# Patient Record
Sex: MALE | Race: BLACK OR AFRICAN AMERICAN | Employment: UNEMPLOYED | URBAN - METROPOLITAN AREA
[De-identification: names, ages, dates, MRNs, and addresses within clinical notes are randomized per-mention and may not be internally consistent; named-entity substitution may affect disease eponyms.]

---

## 2017-02-18 ENCOUNTER — HOSPITAL ENCOUNTER (EMERGENCY)
Age: 32
Discharge: HOME | End: 2017-02-18
Attending: EMERGENCY MEDICINE
Payer: MEDICAID

## 2017-02-18 ENCOUNTER — APPOINTMENT (OUTPATIENT)
Dept: GENERAL RADIOLOGY | Age: 32
End: 2017-02-18
Payer: MEDICAID

## 2017-02-18 VITALS
HEIGHT: 65 IN | BODY MASS INDEX: 33.32 KG/M2 | DIASTOLIC BLOOD PRESSURE: 92 MMHG | SYSTOLIC BLOOD PRESSURE: 141 MMHG | TEMPERATURE: 98.8 F | RESPIRATION RATE: 16 BRPM | HEART RATE: 82 BPM | OXYGEN SATURATION: 99 % | WEIGHT: 200 LBS

## 2017-02-18 DIAGNOSIS — J20.9 ACUTE BRONCHITIS, UNSPECIFIED ORGANISM: ICD-10-CM

## 2017-02-18 DIAGNOSIS — R05.9 COUGH: Primary | ICD-10-CM

## 2017-02-18 DIAGNOSIS — R05.8 SPUTUM PRODUCTION: ICD-10-CM

## 2017-02-18 PROCEDURE — 6370000000 HC RX 637 (ALT 250 FOR IP): Performed by: EMERGENCY MEDICINE

## 2017-02-18 PROCEDURE — 71020 XR CHEST STANDARD TWO VW: CPT | Performed by: RADIOLOGY

## 2017-02-18 PROCEDURE — 71020 XR CHEST STANDARD TWO VW: CPT

## 2017-02-18 PROCEDURE — 99283 EMERGENCY DEPT VISIT LOW MDM: CPT

## 2017-02-18 RX ORDER — BENZONATATE 100 MG/1
100 CAPSULE ORAL ONCE
Status: COMPLETED | OUTPATIENT
Start: 2017-02-18 | End: 2017-02-18

## 2017-02-18 RX ORDER — BENZONATATE 100 MG/1
100 CAPSULE ORAL 3 TIMES DAILY PRN
Qty: 30 CAPSULE | Refills: 0 | Status: SHIPPED | OUTPATIENT
Start: 2017-02-18 | End: 2017-02-25

## 2017-02-18 RX ORDER — AZITHROMYCIN 250 MG/1
TABLET, FILM COATED ORAL
Qty: 1 PACKET | Refills: 0 | Status: SHIPPED | OUTPATIENT
Start: 2017-02-18 | End: 2017-02-28

## 2017-02-18 RX ADMIN — BENZOCAINE AND MENTHOL 1 LOZENGE: 15; 4 LOZENGE ORAL at 10:24

## 2017-02-18 RX ADMIN — BENZONATATE 100 MG: 100 CAPSULE ORAL at 10:24

## 2017-02-18 ASSESSMENT — PAIN DESCRIPTION - FREQUENCY: FREQUENCY: INTERMITTENT

## 2017-02-18 ASSESSMENT — PAIN SCALES - GENERAL: PAINLEVEL_OUTOF10: 7

## 2017-02-18 ASSESSMENT — ENCOUNTER SYMPTOMS
BLURRED VISION: 0
SPUTUM PRODUCTION: 1
COUGH: 1
ABDOMINAL PAIN: 0
SHORTNESS OF BREATH: 0

## 2017-02-18 ASSESSMENT — PAIN DESCRIPTION - DESCRIPTORS: DESCRIPTORS: SORE

## 2017-02-18 ASSESSMENT — PAIN DESCRIPTION - LOCATION: LOCATION: CHEST;BACK

## 2017-02-18 ASSESSMENT — PAIN DESCRIPTION - PAIN TYPE: TYPE: ACUTE PAIN

## 2018-01-25 ENCOUNTER — HOSPITAL ENCOUNTER (EMERGENCY)
Age: 33
Discharge: HOME | End: 2018-01-25
Attending: EMERGENCY MEDICINE
Payer: MEDICAID

## 2018-01-25 VITALS
RESPIRATION RATE: 18 BRPM | DIASTOLIC BLOOD PRESSURE: 95 MMHG | HEART RATE: 76 BPM | OXYGEN SATURATION: 98 % | TEMPERATURE: 97.7 F | SYSTOLIC BLOOD PRESSURE: 149 MMHG

## 2018-01-25 DIAGNOSIS — H65.03 BILATERAL ACUTE SEROUS OTITIS MEDIA, RECURRENCE NOT SPECIFIED: Primary | ICD-10-CM

## 2018-01-25 PROCEDURE — 6370000000 HC RX 637 (ALT 250 FOR IP): Performed by: PHYSICIAN ASSISTANT

## 2018-01-25 PROCEDURE — 99282 EMERGENCY DEPT VISIT SF MDM: CPT

## 2018-01-25 RX ORDER — AMOXICILLIN 500 MG/1
500 CAPSULE ORAL 3 TIMES DAILY
Qty: 30 CAPSULE | Refills: 0 | Status: SHIPPED | OUTPATIENT
Start: 2018-01-25 | End: 2018-02-04

## 2018-01-25 RX ORDER — IBUPROFEN 800 MG/1
800 TABLET ORAL EVERY 8 HOURS PRN
Qty: 30 TABLET | Refills: 0 | Status: SHIPPED | OUTPATIENT
Start: 2018-01-25 | End: 2018-05-17

## 2018-01-25 RX ORDER — ACETAMINOPHEN 325 MG/1
650 TABLET ORAL ONCE
Status: COMPLETED | OUTPATIENT
Start: 2018-01-25 | End: 2018-01-25

## 2018-01-25 RX ORDER — PSEUDOEPHEDRINE HCL 120 MG/1
120 TABLET, FILM COATED, EXTENDED RELEASE ORAL EVERY 12 HOURS
Qty: 14 TABLET | Refills: 1 | Status: SHIPPED | OUTPATIENT
Start: 2018-01-25 | End: 2018-02-01

## 2018-01-25 RX ORDER — ACETAMINOPHEN 325 MG/1
325 TABLET ORAL EVERY 6 HOURS PRN
Qty: 120 TABLET | Refills: 3 | Status: SHIPPED | OUTPATIENT
Start: 2018-01-25

## 2018-01-25 RX ORDER — AMOXICILLIN 250 MG/1
500 CAPSULE ORAL ONCE
Status: COMPLETED | OUTPATIENT
Start: 2018-01-25 | End: 2018-01-25

## 2018-01-25 RX ADMIN — AMOXICILLIN 500 MG: 250 CAPSULE ORAL at 13:36

## 2018-01-25 RX ADMIN — ACETAMINOPHEN 650 MG: 325 TABLET ORAL at 13:35

## 2018-01-25 ASSESSMENT — ENCOUNTER SYMPTOMS
GASTROINTESTINAL NEGATIVE: 1
RESPIRATORY NEGATIVE: 1
ALLERGIC/IMMUNOLOGIC NEGATIVE: 1

## 2018-01-25 ASSESSMENT — PAIN SCALES - GENERAL
PAINLEVEL_OUTOF10: 6
PAINLEVEL_OUTOF10: 7

## 2018-01-25 NOTE — ED PROVIDER NOTES
[facility]  ED  eMERGENCY dEPARTMENT eNCOUnter   Attending Attestation     Pt Name: Nicki Joiner  MRN: [id number]  [place] 1985  Date of evaluation: 1/25/18       Nicki Joiner is a 28 y.o. male who presents with Otalgia (Pt to ED with c/o bilateral ear pain since this last night worse this morning, pt states is getting over \"a bug\" no sore throat, no n/v/d)      History: Pt presents with Bilateral ear pain. Pt states he has recently been ill. No fever, chills, nausea, vomiting or diarrhea. No other complaints. Exam:  Bilateral ear erythema more severe on right with bulging. Will plan to start abx and discharge for otitis media. I performed a history and physical examination of the patient and discussed management with the resident. I reviewed the residents note and agree with the documented findings and plan of care. Any areas of disagreement are noted on the chart. I was personally present for the key portions of any procedures. I have documented in the chart those procedures where I was not present during the key portions. I have personally reviewed all images and agree with the resident's interpretation. I have reviewed the emergency nurses triage note. I agree with the chief complaint, past medical history, past surgical history, allergies, medications, social and family history as documented unless otherwise noted below. Documentation of the HPI, Physical Exam and Medical Decision Making performed by medical students or scribes is based on my personal performance of the HPI, PE and MDM. For Phys Assistant/ Nurse Practitioner cases/documentation I have had a face to face evaluation of this patient and have completed at least one if not all key elements of the E/M (history, physical exam, and MDM). Additional findings are as noted.     For APC cases I have personally evaluated and examined the patient in conjunction with the APC and agree with the treatment plan and disposition of the patient as recorded by the APC.     Francis Alvarez MD  Attending Emergency  Physician        Juliette Dunbar MD  01/25/18 1983

## 2018-01-26 NOTE — ED PROVIDER NOTES
name: N/A    Number of children: N/A    Years of education: N/A     Occupational History    Not on file. Social History Main Topics    Smoking status: Current Every Day Smoker     Types: Cigarettes    Smokeless tobacco: Not on file    Alcohol use No    Drug use: Yes     Types: Marijuana    Sexual activity: Not on file     Other Topics Concern    Not on file     Social History Narrative    No narrative on file       History reviewed. No pertinent family history. Allergies:  Review of patient's allergies indicates no known allergies. Home Medications:  Prior to Admission medications    Medication Sig Start Date End Date Taking? Authorizing Provider   acetaminophen (TYLENOL) 325 MG tablet Take 1 tablet by mouth every 6 hours as needed for Pain 1/25/18  Yes Allie Duncan PA-C   ibuprofen (ADVIL;MOTRIN) 800 MG tablet Take 1 tablet by mouth every 8 hours as needed for Pain or Fever 1/25/18  Yes Allie Duncan PA-C   benzocaine-menthol (CEPACOL SORE THROAT) 15-3.6 MG lozenge Take 1 lozenge by mouth every 2 hours as needed for Sore Throat 1/25/18  Yes Kelley Caputo PA-C   amoxicillin (AMOXIL) 500 MG capsule Take 1 capsule by mouth 3 times daily for 10 days 1/25/18 2/4/18 Yes Kelley Caputo PA-C   pseudoephedrine (SUDAFED 12 HOUR) 120 MG extended release tablet Take 1 tablet by mouth every 12 hours for 7 days For sinus congestion and ear pain 1/25/18 2/1/18 Yes Allie Duncan PA-C       REVIEW OF SYSTEMS    (2-9 systems for level 4, 10 or more for level 5)      Review of Systems   Constitutional: Negative. HENT: Positive for ear pain. Respiratory: Negative. Cardiovascular: Negative. Gastrointestinal: Negative. Genitourinary: Negative. Skin: Negative. Allergic/Immunologic: Negative. PHYSICAL EXAM   (up to 7 for level 4, 8 or more for level 5)      INITIAL VITALS:  oral temperature is 97.7 °F (36.5 °C).  His blood pressure is

## 2018-02-02 ENCOUNTER — HOSPITAL ENCOUNTER (EMERGENCY)
Age: 33
Discharge: HOME | End: 2018-02-02
Attending: EMERGENCY MEDICINE
Payer: MEDICAID

## 2018-02-02 ENCOUNTER — APPOINTMENT (OUTPATIENT)
Dept: GENERAL RADIOLOGY | Age: 33
End: 2018-02-02
Payer: MEDICAID

## 2018-02-02 VITALS
WEIGHT: 210 LBS | BODY MASS INDEX: 31.83 KG/M2 | OXYGEN SATURATION: 97 % | DIASTOLIC BLOOD PRESSURE: 88 MMHG | HEART RATE: 77 BPM | RESPIRATION RATE: 19 BRPM | TEMPERATURE: 98.8 F | SYSTOLIC BLOOD PRESSURE: 134 MMHG | HEIGHT: 68 IN

## 2018-02-02 DIAGNOSIS — F41.0 ANXIETY ATTACK: Primary | ICD-10-CM

## 2018-02-02 DIAGNOSIS — H93.8X3 SENSATION OF FULLNESS IN BOTH EARS: ICD-10-CM

## 2018-02-02 LAB
ABSOLUTE EOS #: 0.04 K/UL (ref 0–0.44)
ABSOLUTE IMMATURE GRANULOCYTE: 0.04 K/UL (ref 0–0.3)
ABSOLUTE LYMPH #: 3.09 K/UL (ref 1.1–3.7)
ABSOLUTE MONO #: 0.65 K/UL (ref 0.1–1.2)
ANION GAP SERPL CALCULATED.3IONS-SCNC: 12 MMOL/L (ref 9–17)
BASOPHILS # BLD: 0 % (ref 0–2)
BASOPHILS ABSOLUTE: <0.03 K/UL (ref 0–0.2)
BUN BLDV-MCNC: 10 MG/DL (ref 6–20)
BUN/CREAT BLD: ABNORMAL (ref 9–20)
CALCIUM SERPL-MCNC: 9.4 MG/DL (ref 8.6–10.4)
CHLORIDE BLD-SCNC: 101 MMOL/L (ref 98–107)
CO2: 25 MMOL/L (ref 20–31)
CREAT SERPL-MCNC: 1.04 MG/DL (ref 0.7–1.2)
DIFFERENTIAL TYPE: ABNORMAL
EKG ATRIAL RATE: 90 BPM
EKG P AXIS: 37 DEGREES
EKG P-R INTERVAL: 158 MS
EKG Q-T INTERVAL: 324 MS
EKG QRS DURATION: 98 MS
EKG QTC CALCULATION (BAZETT): 396 MS
EKG R AXIS: 4 DEGREES
EKG T AXIS: 20 DEGREES
EKG VENTRICULAR RATE: 90 BPM
EOSINOPHILS RELATIVE PERCENT: 0 % (ref 1–4)
GFR AFRICAN AMERICAN: >60 ML/MIN
GFR NON-AFRICAN AMERICAN: >60 ML/MIN
GFR SERPL CREATININE-BSD FRML MDRD: ABNORMAL ML/MIN/{1.73_M2}
GFR SERPL CREATININE-BSD FRML MDRD: ABNORMAL ML/MIN/{1.73_M2}
GLUCOSE BLD-MCNC: 120 MG/DL (ref 70–99)
HCT VFR BLD CALC: 46 % (ref 40.7–50.3)
HEMOGLOBIN: 14.3 G/DL (ref 13–17)
IMMATURE GRANULOCYTES: 0 %
LYMPHOCYTES # BLD: 30 % (ref 24–43)
MCH RBC QN AUTO: 27.3 PG (ref 25.2–33.5)
MCHC RBC AUTO-ENTMCNC: 31.1 G/DL (ref 28.4–34.8)
MCV RBC AUTO: 87.8 FL (ref 82.6–102.9)
MONOCYTES # BLD: 6 % (ref 3–12)
NRBC AUTOMATED: 0 PER 100 WBC
PDW BLD-RTO: 13.3 % (ref 11.8–14.4)
PLATELET # BLD: 208 K/UL (ref 138–453)
PLATELET ESTIMATE: ABNORMAL
PMV BLD AUTO: 12 FL (ref 8.1–13.5)
POC TROPONIN I: 0 NG/ML (ref 0–0.1)
POC TROPONIN I: 0 NG/ML (ref 0–0.1)
POC TROPONIN INTERP: NORMAL
POC TROPONIN INTERP: NORMAL
POTASSIUM SERPL-SCNC: 4.2 MMOL/L (ref 3.7–5.3)
RBC # BLD: 5.24 M/UL (ref 4.21–5.77)
RBC # BLD: ABNORMAL 10*6/UL
SEG NEUTROPHILS: 64 % (ref 36–65)
SEGMENTED NEUTROPHILS ABSOLUTE COUNT: 6.41 K/UL (ref 1.5–8.1)
SODIUM BLD-SCNC: 138 MMOL/L (ref 135–144)
TSH SERPL DL<=0.05 MIU/L-ACNC: 0.49 MIU/L (ref 0.3–5)
WBC # BLD: 10.2 K/UL (ref 3.5–11.3)
WBC # BLD: ABNORMAL 10*3/UL

## 2018-02-02 PROCEDURE — 71045 X-RAY EXAM CHEST 1 VIEW: CPT

## 2018-02-02 PROCEDURE — 99285 EMERGENCY DEPT VISIT HI MDM: CPT

## 2018-02-02 PROCEDURE — 80048 BASIC METABOLIC PNL TOTAL CA: CPT

## 2018-02-02 PROCEDURE — 84443 ASSAY THYROID STIM HORMONE: CPT

## 2018-02-02 PROCEDURE — 93005 ELECTROCARDIOGRAM TRACING: CPT

## 2018-02-02 PROCEDURE — 6370000000 HC RX 637 (ALT 250 FOR IP): Performed by: EMERGENCY MEDICINE

## 2018-02-02 PROCEDURE — 84484 ASSAY OF TROPONIN QUANT: CPT

## 2018-02-02 PROCEDURE — 85025 COMPLETE CBC W/AUTO DIFF WBC: CPT

## 2018-02-02 RX ORDER — LORAZEPAM 0.5 MG/1
1 TABLET ORAL ONCE
Status: COMPLETED | OUTPATIENT
Start: 2018-02-02 | End: 2018-02-02

## 2018-02-02 RX ADMIN — LORAZEPAM 1 MG: 0.5 TABLET ORAL at 09:40

## 2018-02-02 ASSESSMENT — ENCOUNTER SYMPTOMS
SHORTNESS OF BREATH: 0
VOMITING: 0
WHEEZING: 0
NAUSEA: 0
COLOR CHANGE: 0

## 2018-02-02 ASSESSMENT — HEART SCORE: ECG: 1

## 2018-02-02 NOTE — ED TRIAGE NOTES
Patient states that he woke up this morning felt his heart racing. Patient denies any SOB, N/V, or pain. Patient denies any numbness/tingling. Patient denies any Hx of anxiety but states that he felt anxious when his heart was racing.

## 2018-02-02 NOTE — ED PROVIDER NOTES
[facility]     Emergency Department     Faculty Attestation    I performed a history and physical examination of the patient and discussed management with the resident. I have reviewed and agree with the residents findings including all diagnostic interpretations, and treatment plans as written. Any areas of disagreement are noted on the chart. I was personally present for the key portions of any procedures. I have documented in the chart those procedures where I was not present during the key portions. I have reviewed the emergency nurses triage note. I agree with the chief complaint, past medical history, past surgical history, allergies, medications, social and family history as documented unless otherwise noted below. Documentation of the HPI, Physical Exam and Medical Decision Making performed by sy is based on my personal performance of the HPI, PE and MDM. For Physician Assistant/ Nurse Practitioner cases/documentation I have personally evaluated this patient and have completed at least one if not all key elements of the E/M (history, physical exam, and MDM). Additional findings are as noted. Primary Care Physician: No primary care provider on file. History: This is a 28 y.o. male who presents to the Emergency Department with complaint of heart racing. Patient recently being treated for otitis media, on amoxicillin, and still taking antibiotics. Has had decreased hearing for the past week, woke up this morning and continues to have decreased hearing and patient states that he got very anxious and felt his heart racing. Patient denies any drainage or discharge, no worsening hearing, no pain to his ears. Denies any cough or congestion or runny nose no fevers or chills, no chest pain or shortness of breath. Physical:   height is 5' 8\" (1.727 m) and weight is 210 lb (95.3 kg). His oral temperature is 98.8 °F (37.1 °C).  His blood pressure is 158/81 (abnormal) and his pulse is 98. His respiration is 18 and oxygen saturation is 97%. Bilateral tympanic membranes visualized, there is serous otitis media noted, no erythema noted to the tympanic membrane, no erythema to the external ear canal, no pain with external manipulation of the bilateral tragus, no pain over the bilateral mastoids or erythema overlying that region. Cardiac vascular: Regular rate and rhythm no murmurs gallops or rubs,  Respiratory: Lungs are clear to auscultation bilaterally without any rales, wheezes or rhonchi    Impression: Serous otitis media    Plan: CBC, BMP, EKG, one troponin, chest x-ray, decongestant, ENT follow-up      Pre-hypertension/Hypertension: The patient has been informed that they may have pre-hypertension or Hypertension based on a blood pressure reading in the emergency department. I recommend that the patient call the primary care provider listed on their discharge instructions or a physician of their choice this week to arrange follow up for further evaluation of possible pre-hypertension or Hypertension. EKG Interpretation    Interpreted by me    EKG shows normal sinus rhythm with a ventricular rate of 90, MO interval of 158, QRS of 98, , normal axis, no ST-T changes noted, there is poor R-wave progression. Concerning for anterior infarct of age undetermined    No prior EKG for comparison.       Fay Machuca D.O, M.P.H  Attending Emergency Medicine Physician         Fay Machuca DO  02/02/18 [id number]

## 2018-02-02 NOTE — ED PROVIDER NOTES
[facility] ED  Emergency Department Encounter  Emergency Medicine Resident     Pt Name: Susan Matthews  MRN: [id number]  [place] 1985  Date of evaluation: 2/2/18  PCP:  No primary care provider on file. CHIEF COMPLAINT       Chief Complaint   Patient presents with    Tachycardia    Anxiety       HISTORY OF PRESENT ILLNESS  (Location/Symptom, Timing/Onset, Context/Setting, Quality, Duration, Modifying Factors, Severity.)      Susan Matthews is a 28 y.o. male who presents Complaining of anxiety, heart palpitations/tachycardia. States he was here recently for an ear infection and when he awoke this morning in his groggy state he \"freaked out and thought I was deaf for a second and began to have a anxiety attack. \"  Denies a significant history of anxiety. Denies any history of heart problems, lung problems . He is currently on amoxicillin with roughly 3 days left. Reports his ear symptoms have been relatively stable to mildly improved. Denies family hx heart problems. PAST MEDICAL / SURGICAL / SOCIAL / FAMILY HISTORY      has a past medical history of Hand fracture, right.       has a past surgical history that includes hernia repair. Social History     Social History    Marital status: Single     Spouse name: N/A    Number of children: N/A    Years of education: N/A     Occupational History    Not on file. Social History Main Topics    Smoking status: Current Every Day Smoker     Types: Cigarettes    Smokeless tobacco: Not on file    Alcohol use No    Drug use: Yes     Types: Marijuana    Sexual activity: Not on file     Other Topics Concern    Not on file     Social History Narrative    No narrative on file         History reviewed. No pertinent family history. Allergies:  Review of patient's allergies indicates no known allergies. Home Medications:  Prior to Admission medications    Medication Sig Start Date End Date Taking?  Authorizing Provider pseudoephedrine-guaiFENesin 38.5-398 MG TABS Take 1 tablet by mouth every 6 hours as needed (congestion, ear fullness) 2/2/18  Yes He Vidal DO   acetaminophen (TYLENOL) 325 MG tablet Take 1 tablet by mouth every 6 hours as needed for Pain 1/25/18  Yes Brittaney Bhandari PA-C   ibuprofen (ADVIL;MOTRIN) 800 MG tablet Take 1 tablet by mouth every 8 hours as needed for Pain or Fever 1/25/18  Yes Brittaney Bhandari PA-C   benzocaine-menthol (CEPACOL SORE THROAT) 15-3.6 MG lozenge Take 1 lozenge by mouth every 2 hours as needed for Sore Throat 1/25/18  Yes Sánchez Caputo PA-C   amoxicillin (AMOXIL) 500 MG capsule Take 1 capsule by mouth 3 times daily for 10 days 1/25/18 2/4/18 Yes Brittaney Bhandari PA-C       REVIEW OF SYSTEMS    (2-9 systems for level 4, 10 or more for level 5)      Review of Systems   Constitutional: Negative for diaphoresis and fever. HENT: Positive for ear pain and hearing loss. Respiratory: Negative for shortness of breath and wheezing. Cardiovascular: Positive for palpitations. Negative for chest pain and leg swelling. Gastrointestinal: Negative for nausea and vomiting. Endocrine: Negative for polydipsia and polyuria. Genitourinary: Negative for difficulty urinating and dysuria. Musculoskeletal: Negative for arthralgias and joint swelling. Skin: Negative for color change and rash. Neurological: Negative for weakness and numbness. Psychiatric/Behavioral: Negative for self-injury and suicidal ideas. PHYSICAL EXAM   (up to 7 for level 4, 8 or more for level 5)      INITIAL VITALS:   /88   Pulse 77   Temp 98.8 °F (37.1 °C) (Oral)   Resp 19   Ht 5' 8\" (1.727 m)   Wt 210 lb (95.3 kg)   SpO2 97%   BMI 31.93 kg/m²     Physical Exam   Constitutional: He is oriented to person, place, and time. He appears well-developed and well-nourished. HENT:   Head: Normocephalic and atraumatic.    Mild erythema with fluid behind TMs b/l   Eyes: he was deaf for a moment upon awakening this morning, his symptoms have nearly resolved at this time however he does accept my offer for anxiolytic; cardiac screening anticipate discharge, we'll give follow-up with ear nose throat specialist in the case that ENT symptoms continue    DIAGNOSTIC RESULTS / [facility]     LABS:  Labs Reviewed   BASIC METABOLIC PANEL - Abnormal; Notable for the following:        Result Value    Glucose 120 (*)     All other components within normal limits   CBC WITH AUTO DIFFERENTIAL - Abnormal; Notable for the following:     Eosinophils % 0 (*)     All other components within normal limits   TSH WITH REFLEX   POCT TROPONIN   POCT TROPONIN   POCT TROPONIN   POCT TROPONIN     If the patient was admitted, some of the above labs may have been ordered by the admitting team(s) and were auto populated above when I refreshed my note prior to signing it. If there is any question, please check for the responsible provider for individual orders in the EHR system. Additionally, some of the above labs results may still be pending. RADIOLOGY:  All forms of imaging other than ED bedside ultrasounds are viewed and interpreted by a radiologist and their interpretations are displayed below. Xr Chest Portable    Result Date: 2/2/2018  EXAMINATION: SINGLE VIEW OF THE CHEST 2/2/2018 9:43 am COMPARISON: 02/18/2017 HISTORY: ORDERING SYSTEM PROVIDED HISTORY: chest pain TECHNOLOGIST PROVIDED HISTORY: Reason for exam:->chest pain Acute chest pain initial evaluation FINDINGS: The lungs are clear. Pulmonary vascularity is normal.  The cardiomediastinal silhouette is normal.     No acute abnormality.          EKG    Interpreted by me    Rhythm: normal sinus   Rate: normal  Axis: normal  Ectopy: none  Conduction: normal  ST Segments: no acute change  T Waves: nonspecific  Q Waves: no acute change    Clinical Impression: normal sinus rhythm, , poor R wave progression, nonspecific care provider listed in the discharge instructions or a physician of the patient's choice this week to arrange follow up for further evaluation of possible pre-hypertension or hypertension. PATIENT REFERRED TO:  Taurus Mohamud MD  [address] [facility]  [phone]    Schedule an appointment as soon as possible for a visit  Follow up on decreased hearing. [place] [id number]  [address] [address] [phone]  On 2/19/2018  For post hospital follow-up, APPOINTMENT TIME: 10:00 am , Please arrive 15 minutes early, Please bring photo ID insurance card and med list      DISCHARGE MEDICATIONS:  New Prescriptions    PSEUDOEPHEDRINE-GUAIFENESIN 38.5-398 MG TABS    Take 1 tablet by mouth every 6 hours as needed (congestion, ear fullness)       Gurinder PALMA   Emergency Medicine Resident Physician    (Please note that portions of this note were completed with a voice recognition program.  Efforts were made to edit the dictations but occasionally words are mis-transcribed.)     Gurinder Adams DO  Resident  02/02/18 [id number]

## 2018-02-02 NOTE — CARE COORDINATION
Pt listed as not having a PCP. Offered to make appointment for pt at clinic. Pt accepted. Appointment made at the [facility] on 2/19 at 1000. Pt updated and appointment card given. Pt instructed to bring photo ID,med list and insurance card to appointment. Informed pt to call clinic 24 hours in advance if unable to make appointment. Pt updated per clinic policy that a no show results in dismissal from the practice. Pt verbalized understanding.  Appointment updated on AVS

## 2018-03-08 ENCOUNTER — OFFICE VISIT (OUTPATIENT)
Dept: INTERNAL MEDICINE | Age: 33
End: 2018-03-08
Payer: MEDICAID

## 2018-03-08 VITALS
WEIGHT: 257 LBS | SYSTOLIC BLOOD PRESSURE: 132 MMHG | DIASTOLIC BLOOD PRESSURE: 88 MMHG | HEART RATE: 78 BPM | BODY MASS INDEX: 38.95 KG/M2 | HEIGHT: 68 IN

## 2018-03-08 DIAGNOSIS — H91.92 HEARING LOSS OF LEFT EAR, UNSPECIFIED HEARING LOSS TYPE: ICD-10-CM

## 2018-03-08 DIAGNOSIS — R06.83 SNORING: ICD-10-CM

## 2018-03-08 DIAGNOSIS — E66.9 CLASS 2 OBESITY WITHOUT SERIOUS COMORBIDITY WITH BODY MASS INDEX (BMI) OF 39.0 TO 39.9 IN ADULT, UNSPECIFIED OBESITY TYPE: ICD-10-CM

## 2018-03-08 PROCEDURE — 99203 OFFICE O/P NEW LOW 30 MIN: CPT | Performed by: STUDENT IN AN ORGANIZED HEALTH CARE EDUCATION/TRAINING PROGRAM

## 2018-03-08 PROCEDURE — G8427 DOCREV CUR MEDS BY ELIG CLIN: HCPCS | Performed by: STUDENT IN AN ORGANIZED HEALTH CARE EDUCATION/TRAINING PROGRAM

## 2018-03-08 PROCEDURE — G8484 FLU IMMUNIZE NO ADMIN: HCPCS | Performed by: STUDENT IN AN ORGANIZED HEALTH CARE EDUCATION/TRAINING PROGRAM

## 2018-03-08 PROCEDURE — 4004F PT TOBACCO SCREEN RCVD TLK: CPT | Performed by: STUDENT IN AN ORGANIZED HEALTH CARE EDUCATION/TRAINING PROGRAM

## 2018-03-08 PROCEDURE — G8417 CALC BMI ABV UP PARAM F/U: HCPCS | Performed by: STUDENT IN AN ORGANIZED HEALTH CARE EDUCATION/TRAINING PROGRAM

## 2018-03-08 ASSESSMENT — PATIENT HEALTH QUESTIONNAIRE - PHQ9
2. FEELING DOWN, DEPRESSED OR HOPELESS: 0
1. LITTLE INTEREST OR PLEASURE IN DOING THINGS: 0
SUM OF ALL RESPONSES TO PHQ9 QUESTIONS 1 & 2: 0
SUM OF ALL RESPONSES TO PHQ QUESTIONS 1-9: 0

## 2018-03-08 NOTE — PROGRESS NOTES
TABS Take 1 tablet by mouth every 6 hours as needed (congestion, ear fullness) 20 tablet 0    acetaminophen (TYLENOL) 325 MG tablet Take 1 tablet by mouth every 6 hours as needed for Pain 120 tablet 3    ibuprofen (ADVIL;MOTRIN) 800 MG tablet Take 1 tablet by mouth every 8 hours as needed for Pain or Fever 30 tablet 0    benzocaine-menthol (CEPACOL SORE THROAT) 15-3.6 MG lozenge Take 1 lozenge by mouth every 2 hours as needed for Sore Throat 40 lozenge 0     No current facility-administered medications on file prior to visit. FAMILY HISTORY:          Problem Relation Age of Onset    Sleep Apnea Mother     Bipolar Disorder Mother        REVIEW OF SYSTEMS:    · General: no significant weight changes. · Dermatological: no abnormal pigmentation, rash, or itching. · Ears/Nose/Throat: denies abnormal smelling or throat pain, left sided hearing loss+  · Respiratory: no cough, pleuritic chest pain, dyspnea, or wheezing  · Cardiovascular: no pain, dyspnea on exertion, orthopnea, palpitations, or claudication  · Gastrointestinal: no nausea, vomiting, heartburn, diarrhea, constipation, bloating, or abdominal pain. No bloody or black stools. · Genito-Urinary: no urinary urgency, frequency, dysuria, nocturia, hesitancy, incontinence, or pain. No hematuria  · Musculoskeletal: no myalgia, weakness, or morning stiffness, bilateral knee pain  · Neurologic: no TIA or stroke symptoms. no paralysis, or frequent or severe headaches  · Endocrine: no heat or cold intolerance and no polyuria      PHYSICAL EXAM:      Vitals:    03/08/18 1040   BP: (!) 141/92   Site: Left Arm   Position: Sitting   Cuff Size: Large Adult   Pulse: 78   Weight: 257 lb (116.6 kg)   Height: 5' 8\" (1.727 m)     General appearance - alert, well appearing, and in no distress  Eyes - pupils equal and reactive, extraocular eye movements intact  Ears - could not see right TM, no redness, left TM intact, no redness, or discharge, some wax present.   Mouth - mucous membranes moist, pharynx normal without lesions  Chest - clear to auscultation, no wheezes, rales or rhonchi, symmetric air entry  Heart - normal rate, regular rhythm, normal S1, S2, no murmurs, rubs, clicks or gallops  Abdomen - soft, nontender, nondistended, no masses or organomegaly  Neurological - alert, oriented, normal speech, no focal findings or movement disorder noted  Extremities - peripheral pulses normal, no pedal edema, no clubbing or cyanosis    LABORATORY FINDINGS:    CBC:   Lab Results   Component Value Date    WBC 10.2 02/02/2018    HGB 14.3 02/02/2018     02/02/2018     BMP:    Lab Results   Component Value Date     02/02/2018    K 4.2 02/02/2018     02/02/2018    CO2 25 02/02/2018    BUN 10 02/02/2018    CREATININE 1.04 02/02/2018    GLUCOSE 120 02/02/2018     Hemoglobin A1C: No results found for: LABA1C  Lipid profile: No results found for: CHOL, TRIG, HDL  Thyroid functions:   Lab Results   Component Value Date    TSH 0.49 02/02/2018      Hepatic functions: No results found for: ALT, AST, PROT, BILITOT, BILIDIR, LABALBU  ASSESSMENT AND PLAN:     ?[place]  Patient feels tired, sleepy, snoring loud and stops breathing while sleeping, noticed by family member  STOP BANG score of atleast 5 - high risk of sleep apnea  Will do a sleep study  Advised weight loss and exercise    Hearing loss - left side  If getting worse would recommend ENT referral      INSTRUCTIONS:   Follow up in 3 months    · Makayla Coleman received counseling on the following healthy behaviors: exercise    · Reviewed prior labs and health maintenance. · Discussed use, benefit, and side effects of prescribed medications. Barriers to medication compliance addressed. All patient questions answered. Pt voiced understanding. · Patient given educational materials - see patient instructions    Mague Duncan MD,   PGY-1 Internal Medicine Resident.   [address],

## 2018-03-08 NOTE — PROGRESS NOTES
Attending Physician Statement  I have discussed the care of Terell Whiting, including pertinent history and exam findings with the resident. I have reviewed the key elements of all parts of the encounter with the resident. I have seen and examined the patient with the resident and the key elements of all parts of the encounter have been performed by me. Added history includes see in ER for ear infection and referred here for daytime sleepiness and snoring and restless sleep with STOP BANG of at least 5. Added exam findings include overweight and very small throat. Ears are ok though mild nasal congestion and some evidence of air pressure issues with drums slightly cloudy. I agree with the assessment, and status of the problem list as documented. The plan and orders should include refer for a sleep study and weight loss advice was given. He also had palps and anxiety at the time of the ERV but does not sound like he has had any sx since  Orders Placed This Encounter   Procedures   [facility] Baseline Diagnostic Sleep Study    and this was also documented by the resident. I agree with the referral to sleep lab. The medication list was reviewed with the resident and is up to date. The return visit should be in 4 weeks .     Golden Morris

## 2018-03-21 ENCOUNTER — TELEPHONE (OUTPATIENT)
Dept: INTERNAL MEDICINE | Age: 33
End: 2018-03-21

## 2018-03-21 DIAGNOSIS — G47.33 OBSTRUCTIVE SLEEP APNEA: Primary | ICD-10-CM

## 2018-04-11 ENCOUNTER — TELEPHONE (OUTPATIENT)
Dept: INTERNAL MEDICINE | Age: 33
End: 2018-04-11

## 2018-04-24 DIAGNOSIS — G47.10 HYPERSOMNIA: Primary | ICD-10-CM

## 2018-05-17 ENCOUNTER — OFFICE VISIT (OUTPATIENT)
Dept: INTERNAL MEDICINE | Age: 33
End: 2018-05-17
Payer: MEDICAID

## 2018-05-17 VITALS
HEIGHT: 65 IN | DIASTOLIC BLOOD PRESSURE: 92 MMHG | SYSTOLIC BLOOD PRESSURE: 126 MMHG | BODY MASS INDEX: 42.32 KG/M2 | HEART RATE: 88 BPM | WEIGHT: 254 LBS

## 2018-05-17 DIAGNOSIS — E66.01 MORBID OBESITY DUE TO EXCESS CALORIES (HCC): Primary | ICD-10-CM

## 2018-05-17 DIAGNOSIS — F17.210 CIGARETTE NICOTINE DEPENDENCE WITHOUT COMPLICATION: ICD-10-CM

## 2018-05-17 DIAGNOSIS — G47.33 OSA (OBSTRUCTIVE SLEEP APNEA): ICD-10-CM

## 2018-05-17 PROBLEM — F17.200 SMOKING: Status: ACTIVE | Noted: 2018-05-17

## 2018-05-17 PROCEDURE — 99213 OFFICE O/P EST LOW 20 MIN: CPT | Performed by: STUDENT IN AN ORGANIZED HEALTH CARE EDUCATION/TRAINING PROGRAM

## 2018-05-17 PROCEDURE — 99213 OFFICE O/P EST LOW 20 MIN: CPT | Performed by: INTERNAL MEDICINE

## 2018-05-30 ENCOUNTER — HOSPITAL ENCOUNTER (OUTPATIENT)
Dept: SLEEP CENTER | Age: 33
Discharge: HOME | End: 2018-06-01
Payer: MEDICAID

## 2018-05-30 DIAGNOSIS — G47.10 HYPERSOMNIA: ICD-10-CM

## 2018-05-30 PROCEDURE — 95810 POLYSOM 6/> YRS 4/> PARAM: CPT

## 2018-05-31 ENCOUNTER — TELEPHONE (OUTPATIENT)
Dept: INTERNAL MEDICINE | Age: 33
End: 2018-05-31

## 2018-05-31 VITALS
BODY MASS INDEX: 38.95 KG/M2 | HEIGHT: 68 IN | WEIGHT: 257 LBS | DIASTOLIC BLOOD PRESSURE: 85 MMHG | HEART RATE: 91 BPM | RESPIRATION RATE: 16 BRPM | SYSTOLIC BLOOD PRESSURE: 148 MMHG

## 2018-05-31 DIAGNOSIS — G47.33 OSA (OBSTRUCTIVE SLEEP APNEA): Primary | ICD-10-CM

## 2018-05-31 ASSESSMENT — SLEEP AND FATIGUE QUESTIONNAIRES
HOW LIKELY ARE YOU TO NOD OFF OR FALL ASLEEP IN A CAR, WHILE STOPPED FOR A FEW MINUTES IN TRAFFIC: 3
HOW LIKELY ARE YOU TO NOD OFF OR FALL ASLEEP WHILE SITTING AND TALKING TO SOMEONE: 3
ESS TOTAL SCORE: 23
HOW LIKELY ARE YOU TO NOD OFF OR FALL ASLEEP WHILE SITTING AND READING: 3
HOW LIKELY ARE YOU TO NOD OFF OR FALL ASLEEP WHILE WATCHING TV: 3
HOW LIKELY ARE YOU TO NOD OFF OR FALL ASLEEP WHILE SITTING QUIETLY AFTER LUNCH WITHOUT ALCOHOL: 3
HOW LIKELY ARE YOU TO NOD OFF OR FALL ASLEEP WHILE SITTING INACTIVE IN A PUBLIC PLACE: 3
HOW LIKELY ARE YOU TO NOD OFF OR FALL ASLEEP WHILE LYING DOWN TO REST IN THE AFTERNOON WHEN CIRCUMSTANCES PERMIT: 2
HOW LIKELY ARE YOU TO NOD OFF OR FALL ASLEEP WHEN YOU ARE A PASSENGER IN A CAR FOR AN HOUR WITHOUT A BREAK: 3

## 2018-06-13 ENCOUNTER — HOSPITAL ENCOUNTER (OUTPATIENT)
Dept: SLEEP CENTER | Age: 33
Discharge: HOME | End: 2018-06-15
Payer: MEDICAID

## 2018-06-13 DIAGNOSIS — G47.33 OSA (OBSTRUCTIVE SLEEP APNEA): ICD-10-CM

## 2018-06-13 PROCEDURE — 95811 POLYSOM 6/>YRS CPAP 4/> PARM: CPT

## 2018-06-14 VITALS
HEIGHT: 68 IN | HEART RATE: 68 BPM | DIASTOLIC BLOOD PRESSURE: 85 MMHG | RESPIRATION RATE: 18 BRPM | SYSTOLIC BLOOD PRESSURE: 148 MMHG | BODY MASS INDEX: 38.95 KG/M2 | WEIGHT: 257 LBS

## 2018-07-03 LAB — STATUS: NORMAL

## 2019-03-14 ENCOUNTER — HOSPITAL ENCOUNTER (EMERGENCY)
Age: 34
Discharge: HOME | End: 2019-03-14
Attending: EMERGENCY MEDICINE
Payer: MEDICAID

## 2019-03-14 VITALS
TEMPERATURE: 97.7 F | RESPIRATION RATE: 20 BRPM | OXYGEN SATURATION: 94 % | DIASTOLIC BLOOD PRESSURE: 86 MMHG | SYSTOLIC BLOOD PRESSURE: 146 MMHG | HEART RATE: 86 BPM | WEIGHT: 240 LBS | BODY MASS INDEX: 39.99 KG/M2 | HEIGHT: 65 IN

## 2019-03-14 DIAGNOSIS — Z20.2 EXPOSURE TO STD: Primary | ICD-10-CM

## 2019-03-14 LAB
C. TRACHOMATIS DNA ,URINE: NEGATIVE
N. GONORRHOEAE DNA, URINE: NEGATIVE
SPECIMEN DESCRIPTION: NORMAL

## 2019-03-14 PROCEDURE — 87491 CHLMYD TRACH DNA AMP PROBE: CPT

## 2019-03-14 PROCEDURE — 6370000000 HC RX 637 (ALT 250 FOR IP): Performed by: STUDENT IN AN ORGANIZED HEALTH CARE EDUCATION/TRAINING PROGRAM

## 2019-03-14 PROCEDURE — 96372 THER/PROPH/DIAG INJ SC/IM: CPT

## 2019-03-14 PROCEDURE — 99283 EMERGENCY DEPT VISIT LOW MDM: CPT

## 2019-03-14 PROCEDURE — 6360000002 HC RX W HCPCS: Performed by: STUDENT IN AN ORGANIZED HEALTH CARE EDUCATION/TRAINING PROGRAM

## 2019-03-14 PROCEDURE — 87591 N.GONORRHOEAE DNA AMP PROB: CPT

## 2019-03-14 RX ORDER — AZITHROMYCIN 250 MG/1
1000 TABLET, FILM COATED ORAL ONCE
Status: COMPLETED | OUTPATIENT
Start: 2019-03-14 | End: 2019-03-14

## 2019-03-14 RX ORDER — CEFTRIAXONE SODIUM 250 MG/1
250 INJECTION, POWDER, FOR SOLUTION INTRAMUSCULAR; INTRAVENOUS ONCE
Status: COMPLETED | OUTPATIENT
Start: 2019-03-14 | End: 2019-03-14

## 2019-03-14 RX ADMIN — CEFTRIAXONE SODIUM 250 MG: 250 INJECTION, POWDER, FOR SOLUTION INTRAMUSCULAR; INTRAVENOUS at 06:17

## 2019-03-14 RX ADMIN — AZITHROMYCIN 1000 MG: 250 TABLET, FILM COATED ORAL at 06:17

## 2019-03-14 ASSESSMENT — ENCOUNTER SYMPTOMS
VOMITING: 0
COUGH: 0
BACK PAIN: 0
WHEEZING: 0
SHORTNESS OF BREATH: 0
CONSTIPATION: 0
NAUSEA: 0
STRIDOR: 0
DIARRHEA: 0
SORE THROAT: 0
ABDOMINAL PAIN: 0
TROUBLE SWALLOWING: 0

## 2019-09-06 ENCOUNTER — HOSPITAL ENCOUNTER (EMERGENCY)
Age: 34
Discharge: HOME | End: 2019-09-06
Attending: EMERGENCY MEDICINE
Payer: MEDICAID

## 2019-09-06 VITALS
RESPIRATION RATE: 16 BRPM | TEMPERATURE: 97.9 F | HEART RATE: 85 BPM | WEIGHT: 210 LBS | BODY MASS INDEX: 34.99 KG/M2 | OXYGEN SATURATION: 98 % | HEIGHT: 65 IN | DIASTOLIC BLOOD PRESSURE: 99 MMHG | SYSTOLIC BLOOD PRESSURE: 147 MMHG

## 2019-09-06 DIAGNOSIS — R21 RASH AND OTHER NONSPECIFIC SKIN ERUPTION: Primary | ICD-10-CM

## 2019-09-06 PROCEDURE — 99282 EMERGENCY DEPT VISIT SF MDM: CPT

## 2019-09-06 PROCEDURE — 6370000000 HC RX 637 (ALT 250 FOR IP): Performed by: STUDENT IN AN ORGANIZED HEALTH CARE EDUCATION/TRAINING PROGRAM

## 2019-09-06 RX ORDER — DIPHENHYDRAMINE HCL 25 MG
25 TABLET ORAL ONCE
Status: COMPLETED | OUTPATIENT
Start: 2019-09-06 | End: 2019-09-06

## 2019-09-06 RX ORDER — DIPHENHYDRAMINE HCL 25 MG
25 CAPSULE ORAL EVERY 4 HOURS PRN
Qty: 30 CAPSULE | Refills: 0 | Status: SHIPPED | OUTPATIENT
Start: 2019-09-06

## 2019-09-06 RX ORDER — WATER / MINERAL OIL / WHITE PETROLATUM 16 OZ
CREAM TOPICAL
Qty: 1 PACKAGE | Refills: 0 | Status: SHIPPED | OUTPATIENT
Start: 2019-09-06

## 2019-09-06 RX ADMIN — DIPHENHYDRAMINE HCL 25 MG: 25 TABLET ORAL at 01:13

## 2019-09-06 ASSESSMENT — ENCOUNTER SYMPTOMS
ABDOMINAL PAIN: 0
NAUSEA: 0
SHORTNESS OF BREATH: 0
WHEEZING: 0
COUGH: 0
CHEST TIGHTNESS: 0
VOMITING: 0

## 2019-09-06 NOTE — ED PROVIDER NOTES
[facility]     Emergency Department     Faculty Attestation    I performed a history and physical examination of the patient and discussed management with the resident. I have reviewed and agree with the residents findings including all diagnostic interpretations, and treatment plans as written. Any areas of disagreement are noted on the chart. I was personally present for the key portions of any procedures. I have documented in the chart those procedures where I was not present during the key portions. I have reviewed the emergency nurses triage note. I agree with the chief complaint, past medical history, past surgical history, allergies, medications, social and family history as documented unless otherwise noted below. Documentation of the HPI, Physical Exam and Medical Decision Making performed by uliibsejal is based on my personal performance of the HPI, PE and MDM. For Physician Assistant/ Nurse Practitioner cases/documentation I have personally evaluated this patient and have completed at least one if not all key elements of the E/M (history, physical exam, and MDM). Additional findings are as noted. 30 yo M c/o rash posterior neck , mild itching, no fever, no other rash, pt thinks skin was irritated possibly with hair cut,   pe vss hyperpigmented scaling rash posterior neck crease 5 mm excoriation, no gross drainage, no indication of cellulitis,     Possible eczema vs superficial follicular irritation,       Pre-hypertension/Hypertension: The patient has been informed that they may have pre-hypertension or Hypertension based on a blood pressure reading in the emergency department. I recommend that the patient call the primary care provider listed on their discharge instructions or a physician of their choice this week to arrange follow up for further evaluation of possible pre-hypertension or Hypertension.       EKG Interpretation    Interpreted by
Physical Exam   Constitutional: He is oriented to person, place, and time. He appears well-developed and well-nourished. No distress. HENT:   Head: Normocephalic and atraumatic. Cardiovascular: Normal rate, regular rhythm and normal heart sounds. Exam reveals no gallop and no friction rub. No murmur heard. Pulmonary/Chest: Effort normal and breath sounds normal. No stridor. No respiratory distress. He has no wheezes. He has no rales. Abdominal: Soft. He exhibits no distension and no mass. There is no tenderness. There is no guarding. Neurological: He is alert and oriented to person, place, and time. Skin: Rash noted. He is not diaphoretic. Flesh-colored scaly rash over the posterior neck crease. No erythema, warmth, fluctuance, discharge. Nursing note and vitals reviewed. DIFFERENTIAL  DIAGNOSIS     PLAN (LABS / IMAGING / EKG):  No orders of the defined types were placed in this encounter. MEDICATIONS ORDERED:  Orders Placed This Encounter   Medications    diphenhydrAMINE (BENADRYL) tablet 25 mg    Skin Protectants, Misc. (EUCERIN) cream     Sig: Apply topically as needed. Dispense:  1 Package     Refill:  0    diphenhydrAMINE (BENADRYL) 25 MG capsule     Sig: Take 1 capsule by mouth every 4 hours as needed for Itching     Dispense:  30 capsule     Refill:  0       DDX: Eczema, acanthosis nigricans, allergic reaction    Initial MDM/Plan/ED course: 29 y.o. male who presents with rash to back of neck. On exam vitals are normal patient is in no acute distress. Physical exam reveals a scaly flesh-colored rash to the posterior neck crease without any erythema, fluctuance, drainage. .  Patient states that it is itchy. No other rashes on his body, no mucosal involvement. Patient denies any history of diabetes but states he has never been tested for it. Patient treated with Benadryl and given Eucerin cream.  Instructed patient to follow-up with his PCP within the week.   Patient

## 2020-12-08 ENCOUNTER — OFFICE VISIT (OUTPATIENT)
Dept: INTERNAL MEDICINE | Age: 35
End: 2020-12-08
Payer: MEDICAID

## 2020-12-08 VITALS
WEIGHT: 263.2 LBS | HEIGHT: 65 IN | SYSTOLIC BLOOD PRESSURE: 129 MMHG | BODY MASS INDEX: 43.85 KG/M2 | DIASTOLIC BLOOD PRESSURE: 99 MMHG | HEART RATE: 80 BPM

## 2020-12-08 PROCEDURE — 99213 OFFICE O/P EST LOW 20 MIN: CPT | Performed by: STUDENT IN AN ORGANIZED HEALTH CARE EDUCATION/TRAINING PROGRAM

## 2020-12-08 PROCEDURE — 4004F PT TOBACCO SCREEN RCVD TLK: CPT | Performed by: STUDENT IN AN ORGANIZED HEALTH CARE EDUCATION/TRAINING PROGRAM

## 2020-12-08 PROCEDURE — G8484 FLU IMMUNIZE NO ADMIN: HCPCS | Performed by: STUDENT IN AN ORGANIZED HEALTH CARE EDUCATION/TRAINING PROGRAM

## 2020-12-08 PROCEDURE — G8417 CALC BMI ABV UP PARAM F/U: HCPCS | Performed by: STUDENT IN AN ORGANIZED HEALTH CARE EDUCATION/TRAINING PROGRAM

## 2020-12-08 PROCEDURE — G8427 DOCREV CUR MEDS BY ELIG CLIN: HCPCS | Performed by: STUDENT IN AN ORGANIZED HEALTH CARE EDUCATION/TRAINING PROGRAM

## 2020-12-08 PROCEDURE — 99211 OFF/OP EST MAY X REQ PHY/QHP: CPT | Performed by: INTERNAL MEDICINE

## 2020-12-08 RX ORDER — HYDROCHLOROTHIAZIDE 25 MG/1
12.5 TABLET ORAL DAILY
Qty: 90 TABLET | Refills: 3 | Status: SHIPPED | OUTPATIENT
Start: 2020-12-08

## 2020-12-08 SDOH — HEALTH STABILITY: MENTAL HEALTH: HOW OFTEN DO YOU HAVE A DRINK CONTAINING ALCOHOL?: MONTHLY OR LESS

## 2020-12-08 ASSESSMENT — PATIENT HEALTH QUESTIONNAIRE - PHQ9
SUM OF ALL RESPONSES TO PHQ QUESTIONS 1-9: 24
1. LITTLE INTEREST OR PLEASURE IN DOING THINGS: 3
5. POOR APPETITE OR OVEREATING: 3
SUM OF ALL RESPONSES TO PHQ9 QUESTIONS 1 & 2: 6
10. IF YOU CHECKED OFF ANY PROBLEMS, HOW DIFFICULT HAVE THESE PROBLEMS MADE IT FOR YOU TO DO YOUR WORK, TAKE CARE OF THINGS AT HOME, OR GET ALONG WITH OTHER PEOPLE: 3
9. THOUGHTS THAT YOU WOULD BE BETTER OFF DEAD, OR OF HURTING YOURSELF: 0
7. TROUBLE CONCENTRATING ON THINGS, SUCH AS READING THE NEWSPAPER OR WATCHING TELEVISION: 3
6. FEELING BAD ABOUT YOURSELF - OR THAT YOU ARE A FAILURE OR HAVE LET YOURSELF OR YOUR FAMILY DOWN: 3
2. FEELING DOWN, DEPRESSED OR HOPELESS: 3
8. MOVING OR SPEAKING SO SLOWLY THAT OTHER PEOPLE COULD HAVE NOTICED. OR THE OPPOSITE, BEING SO FIGETY OR RESTLESS THAT YOU HAVE BEEN MOVING AROUND A LOT MORE THAN USUAL: 3
4. FEELING TIRED OR HAVING LITTLE ENERGY: 3
SUM OF ALL RESPONSES TO PHQ QUESTIONS 1-9: 24
3. TROUBLE FALLING OR STAYING ASLEEP: 3
SUM OF ALL RESPONSES TO PHQ QUESTIONS 1-9: 24

## 2020-12-08 NOTE — PROGRESS NOTES
@[facility]@    [facility]    Progress Note    Date of patient's visit: 12/8/2020    Patient's Name:  Harolyn Bernheim    YOB: 1985            Patient Care Team:  Nidia Oliveira MD as PCP - General (Internal Medicine)    REASON FOR VISIT: Routine outpatient follow     Chief Complaint   Patient presents with   [address]     Former patient of Dr Sabine Lewis Sleep Apnea     Wants to discuss CPAP    Health Maintenance     HIV-pended,     Depression     positive depression screening         HISTORY OF PRESENT ILLNESS:    History was obtained from the patient. Harolyn Bernheim is a 28 y.o. is here requesting DME order for CPAP supplies. Patient does not have any complaints at this visit. Patient did mention that he was in a bit of a low mood because of his mother and sister were recently diagnosed with Covid. He denied any symptoms of major depression. He has past medical history of morbid obesity on CPAP since 2018. Compliant with CPAP. Denies any symptoms of ALY. STOP-BANG score: 4    He has been gaining weight recently, 263 pounds. Counseled on diet and exercise. Smokes 7 cigarettes a day. Counseled on cessation  Consumes alcohol occasionally. Consumes marijuana occasionally. Declines vaccines at this visit. Past Medical History:   Diagnosis Date    Hand fracture, right 1/27/2015       Past Surgical History:   Procedure Laterality Date    HERNIA REPAIR           ALLERGIES    No Known Allergies    MEDICATIONS:      Current Outpatient Medications on File Prior to Visit   Medication Sig Dispense Refill    acetaminophen (TYLENOL) 325 MG tablet Take 1 tablet by mouth every 6 hours as needed for Pain 120 tablet 3    Skin Protectants, Misc. (EUCERIN) cream Apply topically as needed.  (Patient not taking: Reported on 12/8/2020) 1 Package 0    diphenhydrAMINE (BENADRYL) 25 MG capsule Take 1 capsule by mouth every 4 hours as needed for Itching (Patient not taking: Reported on 12/8/2020) 30 capsule 0     No current facility-administered medications on file prior to visit. SOCIAL HISTORY    Reviewed and no change from previous record. Taya Islas  reports that he has been smoking cigarettes. He has been smoking about 0.50 packs per day. He has never used smokeless tobacco.    FAMILY HISTORY:    Reviewed and No change from previous visit    HEALTH MAINTENANCE DUE:      Health Maintenance Due   Topic Date Due    Pneumococcal 0-64 years Vaccine (1 of 1 - PPSV23) 07/04/1991    HIV screen  07/04/2000    DTaP/Tdap/Td vaccine (1 - Tdap) 07/04/2004    Flu vaccine (1) 09/01/2020       REVIEW OF SYSTEMS:    12 point review of symptoms completed and found to be normal except noted in the HPI    · Constitutional: Negative for Fever, chills  · Eyes: Negative for visual changes, diplopia  · ENT: Negative for mouth sores, sore throat. · Cardiovascular: Negative for lightheadedness ,chest pain, palpitations   · Respiratory:Negative for Shortness of breath,cough or wheezing. · Gastrointestinal: Negative for nausea/vomiting, change in bowel habits, abdominal pain  · Genitourinary:Negative for change in bladder habits, dysuria, hematuria. · Musculoskeletal: Negative for joint pain   · Neurological: Negative for headache, change in muscle strength numbness/tingling       PHYSICAL EXAM:      Vitals:    12/08/20 0943 12/08/20 1000   BP: (!) 153/100 (!) 129/99   Pulse: 80 80   Weight: 263 lb 3.2 oz (119.4 kg)    Height: 5' 5\" (1.651 m)      Body mass index is 43.8 kg/m². BP Readings from Last 3 Encounters:   12/08/20 (!) 129/99   09/06/19 (!) 147/99   03/14/19 (!) 146/86        Wt Readings from Last 3 Encounters:   12/08/20 263 lb 3.2 oz (119.4 kg)   09/06/19 210 lb (95.3 kg)   03/14/19 240 lb (108.9 kg)           · General appearance: awake, alert, cooperative  · HEENT: Head: Normocephalic, no lesions, without obvious abnormality.   · Lungs: clear to months. Follow-up TSH    3. Diabetes mellitus screening  Follow-up HbA1c.  risk factors include Family history of type 2 diabetes mellitus and morbid obesity     4. New onset essential hypertension: Hydrochlorothiazide 12.5 mg daily. Monitor blood pressure. Health Maintenance      FOLLOW UP AND INSTRUCTIONS:   No follow-ups on file. 1. Prerna Zhou received counseling on the following healthy behaviors: nutrition, exercise, medication adherence and tobacco cessation    2. Reviewed prior labs and health maintenance. 3. Discussed use, benefit, and side effects of prescribed medications. Barriers to medication compliance addressed. All patient questions answered. Pt voiced understanding.      4. Patient given educational materials - see patient instructions      Gabi Booth MD  PGY-2, Internal Medicine Resident  [facility], [facility]  12/8/2020 10:21 AM

## 2020-12-08 NOTE — PROGRESS NOTES
Attending Physician Statement  I have discussed the care of Josiah Range including pertinent history and exam findings,  with the resident. I have reviewed the key elements of all parts of the encounter with the resident. I agree with the assessment, plan and orders as documented by the resident.   (GE Modifier)    MD DRISS Hunter  Attending Physician, [address] [place] [address] Internal Medicine Residency Program  [address] [place] [address]  12/8/2020, 10:51 AM

## 2020-12-08 NOTE — PATIENT INSTRUCTIONS
Medications e-scribe to pharmacy of pt's choice. Labs given to patient, they will have them done before their next visit. Referral to Nutritional Service was placed, summary of care printed and faxed to office, phone numbers given to the patient, they will contact office for an appt    Patient was given a printed script for Durable Medical Equipment along with a printed list of places that the script may be filled at. Patient was put on a wait list and will be contacted to schedule their next follow up appointment once the schedule is available. If the patient is in need of an appointment before their next visit please call the office at [phone]. After Visit Summary  given and reviewed.     KOJO

## 2021-03-24 ENCOUNTER — TELEPHONE (OUTPATIENT)
Dept: INTERNAL MEDICINE | Age: 36
End: 2021-03-24

## 2021-03-24 NOTE — LETTER
[facility]  [address] [address]  Phone: [phone]  Fax: [phone]    Elsa Bob MD        March 24, 2021    [address]      Dear Cheril Gosselin: This letter is a reminder that you may have diagnostic testing that has not been completed. It is important to your well-being that these test(s) are performed. Please find the outstanding test(s) attached. If you could please have these completed before your next appointment. You can have the test completed at any [facility] facility or Lab. Please see the order for scheduling instructions. Any testing that needs completed other than blood work or xray's please call [phone] to schedule an appointment. Otherwise can be done at any [facility]. Please call our office at Dept: [phone] for additional information on the outstanding tests or let us know if they have been completed so we may update your chart. If you have any questions or concerns, please don't hesitate to call.     Sincerely,        Elsa Bob MD

## 2021-07-02 ENCOUNTER — TELEPHONE (OUTPATIENT)
Dept: INTERNAL MEDICINE | Age: 36
End: 2021-07-02

## 2021-07-02 NOTE — LETTER
[facility] [address]  [address] [address]  Phone: [phone]  Fax: [phone]    Radha Smith MD        July 2, 2021    [place] [address] [place] [address]      Dear Rosenda Janessa: This letter is a reminder that you may have diagnostic testing that has not been completed. It is important to your well-being that these test(s) are performed. Please find the outstanding test(s) attached. If you could please have these completed before your next appointment. You can have the test completed at any [facility] facility or Lab. Please see the order for scheduling instructions. Any testing that needs completed other than blood work or xray's please call [phone] to schedule an appointment. Otherwise can be done at any [facility]. Please call our office at Dept: [phone] for additional information on the outstanding tests or let us know if they have been completed so we may update your chart. If you have any questions or concerns, please don't hesitate to call.     Sincerely,        Radha Smith MD

## 2021-10-11 ENCOUNTER — TELEPHONE (OUTPATIENT)
Dept: INTERNAL MEDICINE | Age: 36
End: 2021-10-11

## 2021-10-11 NOTE — LETTER
[facility] [address]  [address] [address]  Phone: [phone]  Fax: [phone]    Bryant Atwood MD        October 11, 2021    [address]      Dear Nia Shallow: This letter is a reminder that you may have diagnostic testing that has not been completed. It is important to your well-being that these test(s) are performed. Please find the outstanding test(s) attached. If you could please have these completed before your next appointment. You can have the test completed at any [address] [place] [address] or Lab. Please see the order for scheduling instructions. Any testing that needs completed other than blood work or xray's please call [phone] to schedule an appointment. Otherwise can be done at any [facility]. Please call our office at Dept: [phone] for additional information on the outstanding tests or let us know if they have been completed so we may update your chart. If you have any questions or concerns, please don't hesitate to call.     Sincerely,        Bryant Atwood MD

## 2022-01-12 ENCOUNTER — TELEPHONE (OUTPATIENT)
Dept: INTERNAL MEDICINE | Age: 37
End: 2022-01-12

## 2024-02-26 ENCOUNTER — HOSPITAL ENCOUNTER (EMERGENCY)
Age: 39
Discharge: HOME | End: 2024-02-26
Attending: EMERGENCY MEDICINE
Payer: MEDICAID

## 2024-02-26 ENCOUNTER — APPOINTMENT (OUTPATIENT)
Dept: GENERAL RADIOLOGY | Age: 39
End: 2024-02-26
Payer: MEDICAID

## 2024-02-26 ENCOUNTER — TELEPHONE (OUTPATIENT)
Dept: INTERNAL MEDICINE | Age: 39
End: 2024-02-26

## 2024-02-26 VITALS
RESPIRATION RATE: 20 BRPM | HEART RATE: 87 BPM | SYSTOLIC BLOOD PRESSURE: 149 MMHG | DIASTOLIC BLOOD PRESSURE: 94 MMHG | TEMPERATURE: 97.9 F | OXYGEN SATURATION: 100 %

## 2024-02-26 DIAGNOSIS — M12.811 ROTATOR CUFF ARTHROPATHY OF RIGHT SHOULDER: Primary | ICD-10-CM

## 2024-02-26 PROCEDURE — 73030 X-RAY EXAM OF SHOULDER: CPT

## 2024-02-26 PROCEDURE — 6360000002 HC RX W HCPCS: Performed by: STUDENT IN AN ORGANIZED HEALTH CARE EDUCATION/TRAINING PROGRAM

## 2024-02-26 PROCEDURE — 96372 THER/PROPH/DIAG INJ SC/IM: CPT

## 2024-02-26 PROCEDURE — 99284 EMERGENCY DEPT VISIT MOD MDM: CPT

## 2024-02-26 PROCEDURE — 6370000000 HC RX 637 (ALT 250 FOR IP): Performed by: STUDENT IN AN ORGANIZED HEALTH CARE EDUCATION/TRAINING PROGRAM

## 2024-02-26 RX ORDER — KETOROLAC TROMETHAMINE 30 MG/ML
30 INJECTION, SOLUTION INTRAMUSCULAR; INTRAVENOUS ONCE
Status: DISCONTINUED | OUTPATIENT
Start: 2024-02-26 | End: 2024-02-26

## 2024-02-26 RX ORDER — IBUPROFEN 600 MG/1
600 TABLET ORAL 3 TIMES DAILY PRN
Qty: 12 TABLET | Refills: 0 | Status: SHIPPED | OUTPATIENT
Start: 2024-02-26

## 2024-02-26 RX ORDER — LIDOCAINE 50 MG/G
1 PATCH TOPICAL DAILY
Qty: 5 PATCH | Refills: 0 | Status: SHIPPED | OUTPATIENT
Start: 2024-02-26 | End: 2024-03-02

## 2024-02-26 RX ORDER — KETOROLAC TROMETHAMINE 30 MG/ML
30 INJECTION, SOLUTION INTRAMUSCULAR; INTRAVENOUS ONCE
Status: COMPLETED | OUTPATIENT
Start: 2024-02-26 | End: 2024-02-26

## 2024-02-26 RX ORDER — ACETAMINOPHEN 500 MG
1000 TABLET ORAL EVERY 6 HOURS PRN
Qty: 12 TABLET | Refills: 0 | Status: SHIPPED | OUTPATIENT
Start: 2024-02-26

## 2024-02-26 RX ORDER — METHOCARBAMOL 500 MG/1
750 TABLET, FILM COATED ORAL ONCE
Status: COMPLETED | OUTPATIENT
Start: 2024-02-26 | End: 2024-02-26

## 2024-02-26 RX ORDER — ACETAMINOPHEN 500 MG
500 TABLET ORAL ONCE
Status: COMPLETED | OUTPATIENT
Start: 2024-02-26 | End: 2024-02-26

## 2024-02-26 RX ORDER — METHOCARBAMOL 750 MG/1
750 TABLET, FILM COATED ORAL 3 TIMES DAILY
Qty: 12 TABLET | Refills: 0 | Status: SHIPPED | OUTPATIENT
Start: 2024-02-26 | End: 2024-03-01

## 2024-02-26 RX ADMIN — ACETAMINOPHEN 500 MG: 500 TABLET ORAL at 07:28

## 2024-02-26 RX ADMIN — KETOROLAC TROMETHAMINE 30 MG: 30 INJECTION, SOLUTION INTRAMUSCULAR; INTRAVENOUS at 07:30

## 2024-02-26 RX ADMIN — METHOCARBAMOL 750 MG: 500 TABLET ORAL at 07:28

## 2024-02-26 ASSESSMENT — ENCOUNTER SYMPTOMS
SINUS PAIN: 0
WHEEZING: 0
ABDOMINAL PAIN: 0
SORE THROAT: 0
BACK PAIN: 0
RHINORRHEA: 0
COLOR CHANGE: 0
DIARRHEA: 0
PHOTOPHOBIA: 0
NAUSEA: 0
SHORTNESS OF BREATH: 0
VOMITING: 0

## 2024-02-26 ASSESSMENT — PAIN SCALES - GENERAL: PAINLEVEL_OUTOF10: 10

## 2024-02-26 ASSESSMENT — PAIN - FUNCTIONAL ASSESSMENT: PAIN_FUNCTIONAL_ASSESSMENT: 0-10

## 2024-02-26 NOTE — TELEPHONE ENCOUNTER
----- Message from Ning Crowder sent at 2/26/2024 10:03 AM EST -----  Subject: Appointment Request    Reason for Call: Established Patient Appointment needed: Routine ED Follow   Up Visit    QUESTIONS    Reason for appointment request? No appointments available during search     Additional Information for Provider? Patient called to make ER visit   follow up from yesterday. He has seen previously Reyna Lloyd but no   appts are showing. Please return call to schedule.   ---------------------------------------------------------------------------  --------------  CALL BACK INFO  [phone]; OK to leave message on voicemail  ---------------------------------------------------------------------------  --------------  SCRIPT ANSWERS

## 2024-02-26 NOTE — ED PROVIDER NOTES
[facility] ED     Emergency Department     Faculty Attestation    I performed a history and physical examination of the patient and discussed management with the resident. I reviewed the resident’s note and agree with the documented findings and plan of care. Any areas of disagreement are noted on the chart. I was personally present for the key portions of any procedures. I have documented in the chart those procedures where I was not present during the key portions. I have reviewed the emergency nurses triage note. I agree with the chief complaint, past medical history, past surgical history, allergies, medications, social and family history as documented unless otherwise noted below. For Physician Assistant/ Nurse Practitioner cases/documentation I have personally evaluated this patient and have completed at least one if not all key elements of the E/M (history, physical exam, and MDM). Additional findings are as noted.    Note Started: 7:18 AM EST    Ongoing right shoulder issues recently was just got out of alf no evaluation Carey was there.  Worse in the last week.  He is right-handed.  On exam significant decreased range of motion secondary to pain but no obvious deformity.  Distally intact radial median ulnar for motor and sensory intact sensation to light touch strong radial pulse.  Will image plan discharge likely need outpatient evaluation for rotator cuff pathologies      Critical Care     none    Luis Manuel Ibrahim MD, FACEP, FAAEM  Attending Emergency  Physician           Luis Manuel Ibrahim MD  02/26/24 8959    
management.        EKG  N/a    All EKG's are interpreted by the Emergency Department Physician who either signs or Co-signs this chart in the absence of a cardiologist.    EMERGENCY DEPARTMENT COURSE:    ED Course as of 02/26/24 0906   Mon Feb 26, 2024   0831 XR SHOULDER RIGHT (MIN 2 VIEWS)  IMPRESSION:  1. Os acromiale.  2. No acute fracture or dislocation.  3. Mild degenerative change of the right glenohumeral joint. [GC]   0853 Reevaluated.  Toradol, Tylenol, and Robaxin improved pain.  Holding off on placing patient in sling due to risk of frozen shoulder.  Giving outpatient orthopedic surgery follow-up.  Patient likely has a rotator cuff tear.  No acute injury on x-ray imaging here in the ED.  Given prescription for Tylenol, Motrin, Robaxin, and Lidoderm patch for symptomatic relief at home.  Educated on rotator cuff tears.  Patient remains neurovascularly intact.  Medically stable for discharge home.  Has appropriate follow-up.  Given appropriate return instructions [GC]      ED Course User Index  [GC] Abraham Mallory DO       PROCEDURES:  N/a    CONSULTS:  None    CRITICAL CARE:  There was significant risk of life threatening deterioration of patient's condition requiring my direct management. Critical care time 00 minutes, excluding any documented procedures.    FINAL IMPRESSION      1. Rotator cuff arthropathy of right shoulder          DISPOSITION / PLAN     DISPOSITION Decision To Discharge 02/26/2024 08:46:34 AM      PATIENT REFERRED TO:  Reyna Lloyd MD  [address] [place], [place] [id number]  [phone]    Call in 1 day  As needed    [facility]  [address] [place] [phone]  Call in 1 day  for shoulder pain      DISCHARGE MEDICATIONS:  New Prescriptions    ACETAMINOPHEN (TYLENOL) 500 MG TABLET    Take 2 tablets by mouth every 6 hours as needed for Pain    IBUPROFEN (ADVIL;MOTRIN) 600 MG TABLET    Take 1 tablet by mouth 3 times daily as needed for Pain    LIDOCAINE

## 2024-02-26 NOTE — DISCHARGE INSTRUCTIONS
Seen in the emergency department for chronic right shoulder pain.  Presentation is consistent with a rotator cuff injury.  Shoulder x-rays show  \"IMPRESSION:  1. Os acromiale.  2. No acute fracture or dislocation.  3. Mild degenerative change of the right glenohumeral joint.\" --Which is essentially saying you have chronic changes in your right shoulder however there is no acute injury or dislocation of your shoulder.    Holding off on placing you in a sling as this can lead to worsening range of motion with your right shoulder with a disease called \"frozen shoulder.\"  So ultimately we will manage your symptoms with pain control and muscle relaxers.  Additionally given you follow-up information for orthopedic clinic.  Please call their clinic today or tomorrow to arrange an outpatient follow-up appointment.  It is very important that you follow-up with our orthopedic team.  Please follow-up with your primary care as needed.  Please return to the emergency department if develop worsened pain, numbness and tingling in your hands, other acute injuries, or any other concerning symptoms.
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
## 2024-02-26 NOTE — TELEPHONE ENCOUNTER
Spoke to patient. Scheduled an ED follow up appointment on 2/28/2024.    Electronically Signed by  Thomas Loving MBA  [facility] IM & OBGYN Practice Manager

## 2024-02-28 ENCOUNTER — OFFICE VISIT (OUTPATIENT)
Dept: INTERNAL MEDICINE | Age: 39
End: 2024-02-28
Payer: MEDICAID

## 2024-02-28 VITALS
WEIGHT: 196.2 LBS | BODY MASS INDEX: 32.69 KG/M2 | TEMPERATURE: 97.4 F | HEIGHT: 65 IN | HEART RATE: 94 BPM | OXYGEN SATURATION: 98 % | SYSTOLIC BLOOD PRESSURE: 146 MMHG | DIASTOLIC BLOOD PRESSURE: 90 MMHG

## 2024-02-28 DIAGNOSIS — Z11.4 SCREENING FOR HIV (HUMAN IMMUNODEFICIENCY VIRUS): ICD-10-CM

## 2024-02-28 DIAGNOSIS — M25.511 RIGHT SHOULDER PAIN, UNSPECIFIED CHRONICITY: ICD-10-CM

## 2024-02-28 DIAGNOSIS — E66.01 MORBID OBESITY DUE TO EXCESS CALORIES (HCC): ICD-10-CM

## 2024-02-28 DIAGNOSIS — M25.811 OS ACROMIALE OF RIGHT SHOULDER: ICD-10-CM

## 2024-02-28 DIAGNOSIS — Z11.59 NEED FOR HEPATITIS C SCREENING TEST: ICD-10-CM

## 2024-02-28 DIAGNOSIS — I10 PRIMARY HYPERTENSION: ICD-10-CM

## 2024-02-28 DIAGNOSIS — Z13.220 LIPID SCREENING: ICD-10-CM

## 2024-02-28 DIAGNOSIS — M12.811 ROTATOR CUFF ARTHROPATHY, RIGHT: Primary | ICD-10-CM

## 2024-02-28 DIAGNOSIS — Z13.1 SCREENING FOR DIABETES MELLITUS: ICD-10-CM

## 2024-02-28 PROCEDURE — 99214 OFFICE O/P EST MOD 30 MIN: CPT

## 2024-02-28 PROCEDURE — 3080F DIAST BP >= 90 MM HG: CPT

## 2024-02-28 PROCEDURE — G8417 CALC BMI ABV UP PARAM F/U: HCPCS

## 2024-02-28 PROCEDURE — 3077F SYST BP >= 140 MM HG: CPT

## 2024-02-28 PROCEDURE — 4004F PT TOBACCO SCREEN RCVD TLK: CPT

## 2024-02-28 PROCEDURE — G8427 DOCREV CUR MEDS BY ELIG CLIN: HCPCS

## 2024-02-28 PROCEDURE — G8484 FLU IMMUNIZE NO ADMIN: HCPCS

## 2024-02-28 SDOH — ECONOMIC STABILITY: INCOME INSECURITY: HOW HARD IS IT FOR YOU TO PAY FOR THE VERY BASICS LIKE FOOD, HOUSING, MEDICAL CARE, AND HEATING?: SOMEWHAT HARD

## 2024-02-28 SDOH — ECONOMIC STABILITY: FOOD INSECURITY: WITHIN THE PAST 12 MONTHS, YOU WORRIED THAT YOUR FOOD WOULD RUN OUT BEFORE YOU GOT MONEY TO BUY MORE.: SOMETIMES TRUE

## 2024-02-28 SDOH — ECONOMIC STABILITY: HOUSING INSECURITY
IN THE LAST 12 MONTHS, WAS THERE A TIME WHEN YOU DID NOT HAVE A STEADY PLACE TO SLEEP OR SLEPT IN A SHELTER (INCLUDING NOW)?: YES

## 2024-02-28 SDOH — ECONOMIC STABILITY: FOOD INSECURITY: WITHIN THE PAST 12 MONTHS, THE FOOD YOU BOUGHT JUST DIDN'T LAST AND YOU DIDN'T HAVE MONEY TO GET MORE.: SOMETIMES TRUE

## 2024-02-28 ASSESSMENT — ENCOUNTER SYMPTOMS
BACK PAIN: 0
COLOR CHANGE: 0

## 2024-02-28 ASSESSMENT — PATIENT HEALTH QUESTIONNAIRE - PHQ9
SUM OF ALL RESPONSES TO PHQ QUESTIONS 1-9: 0
2. FEELING DOWN, DEPRESSED OR HOPELESS: 0
SUM OF ALL RESPONSES TO PHQ QUESTIONS 1-9: 0
SUM OF ALL RESPONSES TO PHQ9 QUESTIONS 1 & 2: 0
1. LITTLE INTEREST OR PLEASURE IN DOING THINGS: 0
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
## 2024-02-29 NOTE — PROGRESS NOTES
Attending Physician Statement  I have discussed the care of Rajiv Menjivar including pertinent history and exam findings,  with the resident. I have reviewed the key elements of all parts of the encounter with the resident.  I agree with the assessment, plan and orders as documented by the resident.  (GE Modifier)    MD DRISS Cuba  Attending Physician, [facility]   Faculty, Internal Medicine Residency Program  [facility]  2/29/2024, 2:26 PM

## 2024-03-07 ENCOUNTER — HOSPITAL ENCOUNTER (OUTPATIENT)
Dept: PHYSICAL THERAPY | Age: 39
Setting detail: THERAPIES SERIES
Discharge: HOME | End: 2024-03-07

## 2024-03-18 ENCOUNTER — TELEPHONE (OUTPATIENT)
Dept: INTERNAL MEDICINE | Age: 39
End: 2024-03-18

## 2024-03-18 NOTE — TELEPHONE ENCOUNTER
Writer reached out to the patient regarding recent request for financial, food, housing, and transportation resources. Patient is interested in the resource list. Writer will mail the document to the patient today.    Electronically Signed by  Thomas Loving  Practice Manager